# Patient Record
Sex: MALE | Race: WHITE | NOT HISPANIC OR LATINO | Employment: FULL TIME | URBAN - METROPOLITAN AREA
[De-identification: names, ages, dates, MRNs, and addresses within clinical notes are randomized per-mention and may not be internally consistent; named-entity substitution may affect disease eponyms.]

---

## 2023-12-11 ENCOUNTER — OFFICE VISIT (OUTPATIENT)
Dept: CARDIOLOGY | Facility: CLINIC | Age: 58
End: 2023-12-11
Payer: COMMERCIAL

## 2023-12-11 ENCOUNTER — TELEPHONE (OUTPATIENT)
Dept: CARDIOLOGY | Facility: CLINIC | Age: 58
End: 2023-12-11

## 2023-12-11 VITALS
HEIGHT: 70 IN | WEIGHT: 185.3 LBS | SYSTOLIC BLOOD PRESSURE: 128 MMHG | HEART RATE: 104 BPM | BODY MASS INDEX: 26.53 KG/M2 | DIASTOLIC BLOOD PRESSURE: 98 MMHG

## 2023-12-11 DIAGNOSIS — R94.31 ABNORMAL EKG: ICD-10-CM

## 2023-12-11 DIAGNOSIS — I10 ESSENTIAL HYPERTENSION: ICD-10-CM

## 2023-12-11 DIAGNOSIS — I47.10 SVT (SUPRAVENTRICULAR TACHYCARDIA) (CMS-HCC): ICD-10-CM

## 2023-12-11 DIAGNOSIS — F17.200 CURRENT EVERY DAY SMOKER: ICD-10-CM

## 2023-12-11 PROCEDURE — 3008F BODY MASS INDEX DOCD: CPT | Performed by: INTERNAL MEDICINE

## 2023-12-11 PROCEDURE — 99214 OFFICE O/P EST MOD 30 MIN: CPT | Performed by: INTERNAL MEDICINE

## 2023-12-11 PROCEDURE — 3080F DIAST BP >= 90 MM HG: CPT | Performed by: INTERNAL MEDICINE

## 2023-12-11 PROCEDURE — 3074F SYST BP LT 130 MM HG: CPT | Performed by: INTERNAL MEDICINE

## 2023-12-11 RX ORDER — LISINOPRIL AND HYDROCHLOROTHIAZIDE 10; 12.5 MG/1; MG/1
1 TABLET ORAL DAILY
COMMUNITY
End: 2023-12-11 | Stop reason: SDUPTHER

## 2023-12-11 RX ORDER — MULTIVITAMIN WITH IRON
1 TABLET ORAL DAILY
COMMUNITY

## 2023-12-11 RX ORDER — FAMOTIDINE 10 MG/1
20 TABLET ORAL DAILY
COMMUNITY

## 2023-12-11 NOTE — PATIENT INSTRUCTIONS
Continue same medications/treatment.  Patient educated on proper medication use.  Patient educated on risk factor modification.  Please bring any lab results from other providers/physicians to your next appointment.    Please bring all medicines, vitamins, and herbal supplements with you when you come to the office.    Prescriptions will not be filled unless you are compliant with your follow up appointments or have a follow up appointment scheduled as per instruction of your physician. Refills should be requested at the time of your visit.      Follow up in 6 months    TRIPP FRIAS RN, AM SCRIBING FOR AND IN THE PRESENCE OF AUSTIN ESCALONA MD

## 2023-12-11 NOTE — PROGRESS NOTES
CARDIOLOGY OFFICE VISIT      CHIEF COMPLAINT  Chief Complaint   Patient presents with    Follow-up     1 year           HISTORY OF PRESENT ILLNESS  Orlando Moe is a 58 y.o. year old male patient with a history of hypertension, atypical chest pain for which she had a stress test in 2015 that was normal.  He also had an echocardiogram that was essentially normal.  He was recently diagnosed with a very high TSH with low T3 and T4 and is following up with the endocrinologist for further evaluation.  He denies any chest pain or significant shortness of breath with his day-to-day activities.  Most recent labs from November 2023 shows TSH is 167 with a T3 of 1.7 and T40.25  Arrival cholesterol is 174, HDL is 43, LDL is 108 and triglyceride is 117    ASSESSMENT AND PLAN  1.  Hypertension: Blood pressure is well-controlled on current medications which we will continue.  2.  Abnormal thyroid function: He appears to have significant hypothyroidism based on his lab work and is following up with his endocrinologist.  I would like to get an echocardiogram to rule out any cardiac involvement especially the presence of pericardial effusion in the setting of significant hypothyroidism.  3.  Dyslipidemia: With acceptable lipid profile as noted above, continue with lipid-lowering therapy.    Problem List Items Addressed This Visit    None      Recent Cardiovascular Testing:    Echo-  Stress-  Cath-  Carotid Ultrasound-    Past Medical History  History reviewed. No pertinent past medical history.    Social History  Social History     Tobacco Use    Smoking status: Every Day     Types: Cigars    Smokeless tobacco: Not on file   Substance Use Topics    Alcohol use: Not Currently    Drug use: Not on file       Family History   No family history on file.     Allergies:  No Known Allergies     Outpatient Medications:  Current Outpatient Medications   Medication Instructions    famotidine (PEPCID AC) 20 mg, oral, Daily     "lisinopriL-hydrochlorothiazide 10-12.5 mg tablet 1 tablet, oral, Daily    multivitamin (Multiple Vitamins) tablet 1 tablet, oral, Daily        Recent Lab Results:    CBC:   Lab Results   Component Value Date    WBC 8.0 10/22/2019    RBC 4.95 10/22/2019    HGB 15.5 10/22/2019    HCT 45.8 10/22/2019     10/22/2019        CMP:    Lab Results   Component Value Date     10/22/2019    K 4.1 10/22/2019     10/22/2019    CO2 30 10/22/2019    BUN 16 10/22/2019    CREATININE 1.14 10/22/2019    GLUCOSE 103 (H) 10/22/2019    CALCIUM 9.8 10/22/2019       Magnesium:    No results found for: \"MG\"    Lipid Profile:    Lab Results   Component Value Date    TRIG 139 10/22/2019    HDL 35.0 (A) 10/22/2019       TSH:    No results found for: \"TSH\"    BNP:   No results found for: \"BNP\"     PT/INR:    No results found for: \"PROTIME\", \"INR\"    HgBA1c:    Lab Results   Component Value Date    HGBA1C 5.2 10/22/2019       BMP:  Lab Results   Component Value Date     10/22/2019    K 4.1 10/22/2019     10/22/2019    CO2 30 10/22/2019    BUN 16 10/22/2019    CREATININE 1.14 10/22/2019       CBC:  Lab Results   Component Value Date    WBC 8.0 10/22/2019    RBC 4.95 10/22/2019    HGB 15.5 10/22/2019    HCT 45.8 10/22/2019    MCV 93 10/22/2019    MCHC 33.8 10/22/2019    RDW 11.9 10/22/2019     10/22/2019       Cardiac Enzymes:    No results found for: \"TROPHS\"    Hepatic Function Panel:    Lab Results   Component Value Date    ALKPHOS 55 10/22/2019    ALT 17 10/22/2019    AST 15 10/22/2019    PROT 6.5 10/22/2019    BILITOT 0.5 10/22/2019    BILIDIR 0.1 10/22/2019         REVIEW OF SYSTEMS  Review of Systems   All other systems reviewed and are negative.      VITALS  Vitals:    12/11/23 0843   BP: (!) 128/98   Pulse: 104     Wt Readings from Last 4 Encounters:   12/11/23 84.1 kg (185 lb 4.8 oz)   12/05/22 79.7 kg (175 lb 12.8 oz)   12/06/21 74.1 kg (163 lb 7 oz)       PHYSICAL EXAM  Constitutional:       " Appearance: Healthy appearance. Not in distress.   Neck:      Vascular: No JVR. JVD normal.   Pulmonary:      Effort: Pulmonary effort is normal.      Breath sounds: Normal breath sounds. No wheezing. No rhonchi. No rales.   Chest:      Chest wall: Not tender to palpatation.   Cardiovascular:      PMI at left midclavicular line. Normal rate. Regular rhythm. Normal S1. Normal S2.       Murmurs: There is no murmur.      No gallop.  No click. No rub.   Pulses:     Intact distal pulses.   Edema:     Peripheral edema absent.   Abdominal:      General: Bowel sounds are normal.      Palpations: Abdomen is soft.      Tenderness: There is no abdominal tenderness.   Musculoskeletal: Normal range of motion.         General: No tenderness. Skin:     General: Skin is warm and dry.   Neurological:      General: No focal deficit present.      Mental Status: Alert and oriented to person, place and time.

## 2023-12-11 NOTE — TELEPHONE ENCOUNTER
Patient was seen this morning and forgot to ask for a refill on his Lisinopril/hydrochlorothiazide 10-12.5 mg be sent to [facility]

## 2023-12-12 RX ORDER — LISINOPRIL AND HYDROCHLOROTHIAZIDE 10; 12.5 MG/1; MG/1
1 TABLET ORAL DAILY
Qty: 90 TABLET | Refills: 3 | Status: SHIPPED | OUTPATIENT
Start: 2023-12-12

## 2024-06-04 ENCOUNTER — HOSPITAL ENCOUNTER (OUTPATIENT)
Dept: CARDIOLOGY | Facility: CLINIC | Age: 59
Discharge: HOME | End: 2024-06-04
Payer: COMMERCIAL

## 2024-06-04 VITALS
SYSTOLIC BLOOD PRESSURE: 115 MMHG | HEIGHT: 70 IN | WEIGHT: 185 LBS | DIASTOLIC BLOOD PRESSURE: 75 MMHG | BODY MASS INDEX: 26.48 KG/M2

## 2024-06-04 DIAGNOSIS — I47.10 SVT (SUPRAVENTRICULAR TACHYCARDIA) (CMS-HCC): ICD-10-CM

## 2024-06-04 LAB
AORTIC VALVE MEAN GRADIENT: 3 MMHG
AORTIC VALVE PEAK VELOCITY: 1.11 M/S
AV PEAK GRADIENT: 4.9 MMHG
AVA (PEAK VEL): 3.28 CM2
AVA (VTI): 3.58 CM2
EJECTION FRACTION APICAL 4 CHAMBER: 55.7
LEFT VENTRICLE INTERNAL DIMENSION DIASTOLE: 4.3 CM (ref 3.5–6)
LEFT VENTRICULAR OUTFLOW TRACT DIAMETER: 2.1 CM
LV EJECTION FRACTION BIPLANE: 57 %
MITRAL VALVE E/A RATIO: 0.63
MITRAL VALVE E/E' RATIO: 7
RIGHT VENTRICLE PEAK SYSTOLIC PRESSURE: 23.6 MMHG

## 2024-06-04 PROCEDURE — 93306 TTE W/DOPPLER COMPLETE: CPT | Performed by: INTERNAL MEDICINE

## 2024-06-04 PROCEDURE — 93306 TTE W/DOPPLER COMPLETE: CPT

## 2024-06-12 ENCOUNTER — APPOINTMENT (OUTPATIENT)
Dept: CARDIOLOGY | Facility: CLINIC | Age: 59
End: 2024-06-12
Payer: COMMERCIAL

## 2024-06-21 ENCOUNTER — APPOINTMENT (OUTPATIENT)
Dept: CARDIOLOGY | Facility: CLINIC | Age: 59
End: 2024-06-21
Payer: COMMERCIAL

## 2024-06-21 VITALS
BODY MASS INDEX: 24.78 KG/M2 | HEIGHT: 70 IN | HEART RATE: 88 BPM | WEIGHT: 173.1 LBS | SYSTOLIC BLOOD PRESSURE: 122 MMHG | DIASTOLIC BLOOD PRESSURE: 82 MMHG

## 2024-06-21 DIAGNOSIS — E07.9 THYROID DISORDER: ICD-10-CM

## 2024-06-21 DIAGNOSIS — I10 ESSENTIAL HYPERTENSION: ICD-10-CM

## 2024-06-21 DIAGNOSIS — F17.200 CURRENT EVERY DAY SMOKER: ICD-10-CM

## 2024-06-21 DIAGNOSIS — I51.7 LVH (LEFT VENTRICULAR HYPERTROPHY): ICD-10-CM

## 2024-06-21 DIAGNOSIS — R94.31 ABNORMAL EKG: ICD-10-CM

## 2024-06-21 DIAGNOSIS — Z13.220 SCREENING FOR LIPID DISORDERS: ICD-10-CM

## 2024-06-21 PROCEDURE — 3074F SYST BP LT 130 MM HG: CPT | Performed by: INTERNAL MEDICINE

## 2024-06-21 PROCEDURE — 3008F BODY MASS INDEX DOCD: CPT | Performed by: INTERNAL MEDICINE

## 2024-06-21 PROCEDURE — 99214 OFFICE O/P EST MOD 30 MIN: CPT | Performed by: INTERNAL MEDICINE

## 2024-06-21 PROCEDURE — 3079F DIAST BP 80-89 MM HG: CPT | Performed by: INTERNAL MEDICINE

## 2024-06-21 RX ORDER — LISINOPRIL AND HYDROCHLOROTHIAZIDE 10; 12.5 MG/1; MG/1
1 TABLET ORAL DAILY
Qty: 90 TABLET | Refills: 3 | Status: SHIPPED | OUTPATIENT
Start: 2024-06-21

## 2024-06-21 RX ORDER — LEVOTHYROXINE SODIUM 150 UG/1
150 TABLET ORAL
COMMUNITY
Start: 2024-06-06

## 2024-06-21 ASSESSMENT — ENCOUNTER SYMPTOMS
CARDIOVASCULAR NEGATIVE: 1
NEUROLOGICAL NEGATIVE: 1
RESPIRATORY NEGATIVE: 1
CONSTITUTIONAL NEGATIVE: 1

## 2024-06-21 NOTE — PROGRESS NOTES
CARDIOLOGY OFFICE VISIT      CHIEF COMPLAINT  Chief Complaint   Patient presents with    Follow-up     6 MONTH FOLLOW UP AND TO REVIEW RECENT ECHOCARDIOGRAM RESULTS        HISTORY OF PRESENT ILLNESS  Orlando Moe is a 58 y.o. year old male patient with a history of hypertension, atypical chest pain for which he had a stress test in 2015 that was normal.  He also had an echocardiogram that showed mild to moderate concentric LVH which is unchanged on his repeat echocardiogram in June 2024.  LV function is normal.  He is recently diagnosed with severe hypothyroidism for which she follows with the endocrinologist.  He says been doing well denying any chest pain palpitations presyncope or syncope.  Unfortunately continues to smoke about a pack per day.  Lipids from December 2023 shows total cholesterol is 174, HDL is 43, LDL is 108 and triglyceride is 117    ASSESSMENT AND PLAN  1.  Hypertension, blood pressure is well-controlled current medications which we will continue.  2.  Hypothyroidism: Currently on thyroid replacement therapy with normalization of his thyroid function.  3.  Dyslipidemia: With acceptable lipid profile as noted above, continue with current lipid lowering therapy.    Problem List Items Addressed This Visit       Essential hypertension    Current every day smoker    Abnormal EKG    BMI 26.0-26.9,adult    LVH (left ventricular hypertrophy)    Thyroid disorder     Other Visit Diagnoses       Screening for lipid disorders                Recent Cardiovascular Testing:    Echo-  Stress-  Cath-  Carotid Ultrasound-    Past Medical History  No past medical history on file.    Social History  Social History     Tobacco Use    Smoking status: Every Day     Types: Cigars     Start date: 1994    Smokeless tobacco: Not on file   Substance Use Topics    Alcohol use: Not Currently    Drug use: Not Currently       Family History     Family History   Problem Relation Name Age of Onset    Hypertension Mother       "Lung cancer Father          Allergies:  No Known Allergies     Outpatient Medications:  Current Outpatient Medications   Medication Instructions    bioflav,lemon/vit Bcomp,C (LIPO-FLAVONOID PLUS ORAL) oral, Lipo-Flavonoid Plus TABS; 1 to 2 daily as needed    famotidine (PEPCID AC) 20 mg, oral, Daily    glucos sul 2KCl/msm/chond/C/Mn (GLUCOSAMINE CHONDROITIN ORAL) oral    levothyroxine (SYNTHROID, LEVOXYL) 150 mcg, oral, Daily (0630)    lisinopriL-hydrochlorothiazide 10-12.5 mg tablet 1 tablet, oral, Daily    multivitamin (Multiple Vitamins) tablet 1 tablet, oral, Daily        Recent Lab Results:    CBC:   Lab Results   Component Value Date    WBC 8.0 10/22/2019    RBC 4.95 10/22/2019    HGB 15.5 10/22/2019    HCT 45.8 10/22/2019     10/22/2019        CMP:    Lab Results   Component Value Date     10/22/2019    K 4.1 10/22/2019     10/22/2019    CO2 30 10/22/2019    BUN 16 10/22/2019    CREATININE 1.14 10/22/2019    GLUCOSE 103 (H) 10/22/2019    CALCIUM 9.8 10/22/2019       Magnesium:    No results found for: \"MG\"    Lipid Profile:    Lab Results   Component Value Date    TRIG 139 10/22/2019    HDL 35.0 (A) 10/22/2019       TSH:    No results found for: \"TSH\"    BNP:   No results found for: \"BNP\"     PT/INR:    No results found for: \"PROTIME\", \"INR\"    HgBA1c:    Lab Results   Component Value Date    HGBA1C 5.6 11/12/2023       BMP:  Lab Results   Component Value Date     10/22/2019    K 4.1 10/22/2019     10/22/2019    CO2 30 10/22/2019    BUN 16 10/22/2019    CREATININE 1.14 10/22/2019       CBC:  Lab Results   Component Value Date    WBC 8.0 10/22/2019    RBC 4.95 10/22/2019    HGB 15.5 10/22/2019    HCT 45.8 10/22/2019    MCV 93 10/22/2019    MCHC 33.8 10/22/2019    RDW 11.9 10/22/2019     10/22/2019       Cardiac Enzymes:    No results found for: \"TROPHS\"    Hepatic Function Panel:    Lab Results   Component Value Date    ALKPHOS 55 10/22/2019    ALT 17 10/22/2019    AST 15 " 10/22/2019    PROT 6.5 10/22/2019    BILITOT 0.5 10/22/2019    BILIDIR 0.1 10/22/2019         REVIEW OF SYSTEMS  Review of Systems   Constitutional: Negative.   Cardiovascular: Negative.    Respiratory: Negative.     Neurological: Negative.    All other systems reviewed and are negative.      VITALS  Vitals:    06/21/24 0830   BP: 122/82   Pulse: 88     Wt Readings from Last 4 Encounters:   06/21/24 78.5 kg (173 lb 1.6 oz)   06/04/24 83.9 kg (185 lb)   12/11/23 84.1 kg (185 lb 4.8 oz)   12/05/22 79.7 kg (175 lb 12.8 oz)       PHYSICAL EXAM  Constitutional:       Appearance: Healthy appearance.   Eyes:      Pupils: Pupils are equal, round, and reactive to light.   Pulmonary:      Effort: Pulmonary effort is normal.      Breath sounds: Normal breath sounds.   Cardiovascular:      PMI at left midclavicular line. Normal rate. Regular rhythm.      Murmurs: There is no murmur.      No gallop.  No click. No rub.   Pulses:     Intact distal pulses.   Musculoskeletal: Normal range of motion.      Cervical back: Normal range of motion. Skin:     General: Skin is warm and dry.   Neurological:      General: No focal deficit present.      Mental Status: Alert and oriented to person, place and time.

## 2025-01-23 ENCOUNTER — TELEPHONE (OUTPATIENT)
Dept: CARDIOLOGY | Facility: CLINIC | Age: 60
End: 2025-01-23
Payer: COMMERCIAL

## 2025-06-20 ENCOUNTER — APPOINTMENT (OUTPATIENT)
Dept: CARDIOLOGY | Facility: CLINIC | Age: 60
End: 2025-06-20
Payer: COMMERCIAL

## 2025-06-23 ENCOUNTER — APPOINTMENT (OUTPATIENT)
Dept: CARDIOLOGY | Facility: CLINIC | Age: 60
End: 2025-06-23
Payer: COMMERCIAL

## 2025-06-23 VITALS
WEIGHT: 162.3 LBS | DIASTOLIC BLOOD PRESSURE: 80 MMHG | HEART RATE: 78 BPM | HEIGHT: 69 IN | SYSTOLIC BLOOD PRESSURE: 110 MMHG | BODY MASS INDEX: 24.04 KG/M2

## 2025-06-23 DIAGNOSIS — R94.31 ABNORMAL EKG: ICD-10-CM

## 2025-06-23 DIAGNOSIS — Z13.220 SCREENING FOR LIPOID DISORDERS: ICD-10-CM

## 2025-06-23 DIAGNOSIS — I51.7 LVH (LEFT VENTRICULAR HYPERTROPHY): ICD-10-CM

## 2025-06-23 DIAGNOSIS — I10 ESSENTIAL HYPERTENSION: ICD-10-CM

## 2025-06-23 PROCEDURE — 3008F BODY MASS INDEX DOCD: CPT | Performed by: INTERNAL MEDICINE

## 2025-06-23 PROCEDURE — 99213 OFFICE O/P EST LOW 20 MIN: CPT | Performed by: INTERNAL MEDICINE

## 2025-06-23 PROCEDURE — 3074F SYST BP LT 130 MM HG: CPT | Performed by: INTERNAL MEDICINE

## 2025-06-23 PROCEDURE — 3079F DIAST BP 80-89 MM HG: CPT | Performed by: INTERNAL MEDICINE

## 2025-06-23 ASSESSMENT — ENCOUNTER SYMPTOMS
RESPIRATORY NEGATIVE: 1
CONSTITUTIONAL NEGATIVE: 1
NEUROLOGICAL NEGATIVE: 1
CARDIOVASCULAR NEGATIVE: 1

## 2025-06-23 NOTE — PATIENT INSTRUCTIONS
CMP and Lipid  1  year visit  Continue same medications and treatments.   Patient educated on proper medication use.   Patient educated on risk factor modification.   Please bring any lab results from other providers / physicians to your next appointment.     Please bring all medicines, vitamins, and herbal supplements with you when you come to the office.     Prescriptions will not be filled unless you are compliant with your follow up appointments or have a follow up appointment scheduled as per instruction of your physician. Refills should be requested at the time of your visit.

## 2025-06-23 NOTE — PROGRESS NOTES
CARDIOLOGY OFFICE VISIT      CHIEF COMPLAINT  No chief complaint on file.       HISTORY OF PRESENT ILLNESS  Orlando Moe is a 59 y.o. year old male patient with a history of hypertension, atypical chest pain fully showed a stress test in 2015 which was normal.  Echocardiogram shows mild to moderate LVH which is unchanged on his repeat echo in June 2024.  LV function remains normal.  He also has severe hypothyroidism on thyroid replacement therapy.  Unfortunately continues to smoke about a pack a day, but he continues to deny any chest pain or shortness of breath.  I have reviewed his lipids from September 2024 which is within normal limits with an LDL of 108 and total cholesterol of 174.    ASSESSMENT AND PLAN  1.  Hypertension: Blood pressure is well-controlled on current medications including lisinopril HCTZ which the patient is tolerating.  2.  Dyslipidemia: Lipid profile continues to be acceptable without any lipid-lowering therapy.  He is advised on diet and exercise.  3.  Hypothyroidism: This is stable on thyroid replacement therapy and is advised to follow-up with his endocrinologist.    Problem List Items Addressed This Visit       Essential hypertension    Abnormal EKG    BMI 23.0-23.9, adult    LVH (left ventricular hypertrophy)           Past Medical History  Medical History[1]    Social History  Social History[2]    Family History   Family History[3]     Allergies:  RX Allergies[4]     Outpatient Medications:  Current Outpatient Medications   Medication Instructions    bioflav,lemon/vit Bcomp,C (LIPO-FLAVONOID PLUS ORAL) oral, Lipo-Flavonoid Plus TABS; 1 to 2 daily as needed    famotidine (PEPCID AC) 20 mg, oral, Daily    glucos sul 2KCl/msm/chond/C/Mn (GLUCOSAMINE CHONDROITIN ORAL) oral    levothyroxine (SYNTHROID, LEVOXYL) 150 mcg, oral, Daily (0630)    lisinopriL-hydrochlorothiazide 10-12.5 mg tablet 1 tablet, oral, Daily    multivitamin (Multiple Vitamins) tablet 1 tablet, oral, Daily     "    Recent Lab Results:  The following labs have been reviewed noted below;    CBC:   Lab Results   Component Value Date    WBC 8.0 10/22/2019    RBC 4.95 10/22/2019    HGB 15.5 10/22/2019    HCT 45.8 10/22/2019     10/22/2019        CMP:    Lab Results   Component Value Date     10/22/2019    K 4.1 10/22/2019     10/22/2019    CO2 30 10/22/2019    BUN 16 10/22/2019    CREATININE 1.14 10/22/2019    GLUCOSE 103 (H) 10/22/2019    CALCIUM 9.8 10/22/2019       Magnesium:    No results found for: \"MG\"    Lipid Profile:    Lab Results   Component Value Date    TRIG 139 10/22/2019    HDL 35.0 (A) 10/22/2019       TSH:    No results found for: \"TSH\"    BNP:   No results found for: \"BNP\"     PT/INR:    No results found for: \"PROTIME\", \"INR\"    HgBA1c:    Lab Results   Component Value Date    HGBA1C 5.6 11/12/2023       BMP:  Lab Results   Component Value Date     10/22/2019    K 4.1 10/22/2019     10/22/2019    CO2 30 10/22/2019    BUN 16 10/22/2019    CREATININE 1.14 10/22/2019       CBC:  Lab Results   Component Value Date    WBC 8.0 10/22/2019    RBC 4.95 10/22/2019    HGB 15.5 10/22/2019    HCT 45.8 10/22/2019    MCV 93 10/22/2019    MCHC 33.8 10/22/2019    RDW 11.9 10/22/2019     10/22/2019       Cardiac Enzymes:    No results found for: \"TROPHS\"    Hepatic Function Panel:    Lab Results   Component Value Date    ALKPHOS 55 10/22/2019    ALT 17 10/22/2019    AST 15 10/22/2019    PROT 6.5 10/22/2019    BILITOT 0.5 10/22/2019    BILIDIR 0.1 10/22/2019         REVIEW OF SYSTEMS  Review of Systems   Constitutional: Negative.   Cardiovascular: Negative.    Respiratory: Negative.     Neurological: Negative.    All other systems reviewed and are negative.      VITALS  There were no vitals filed for this visit.  Wt Readings from Last 4 Encounters:   06/21/24 78.5 kg (173 lb 1.6 oz)   06/04/24 83.9 kg (185 lb)   12/11/23 84.1 kg (185 lb 4.8 oz)   12/05/22 79.7 kg (175 lb 12.8 oz) "       PHYSICAL EXAM  Constitutional:       Appearance: Healthy appearance.   Eyes:      Pupils: Pupils are equal, round, and reactive to light.   Pulmonary:      Effort: Pulmonary effort is normal.      Breath sounds: Normal breath sounds.   Cardiovascular:      PMI at left midclavicular line. Normal rate. Regular rhythm.      Murmurs: There is no murmur.      No gallop.  No click. No rub.   Pulses:     Intact distal pulses.   Musculoskeletal: Normal range of motion.      Cervical back: Normal range of motion. Skin:     General: Skin is warm and dry.   Neurological:      General: No focal deficit present.      Mental Status: Alert and oriented to person, place and time.             Efra FRIAS LPN   am scribing for, and in the presence of Dr. Adonay Ball MD  .    Dr. Adonay FRIAS MD  , personally performed the services described in the documentation as scribed by Efra Amador LPN   in my presence, and confirm it is both accurate and complete.      Dr. Adonay Ball MD  Thank you for allowing me to participate in the care of this patient. Please do not hesitate to contact me with any further questions or concerns.         [1] No past medical history on file.  [2]   Social History  Tobacco Use    Smoking status: Every Day     Types: Cigars     Start date: 1994    Smokeless tobacco: Not on file   Substance Use Topics    Alcohol use: Not Currently    Drug use: Not Currently   [3]   Family History  Problem Relation Name Age of Onset    Hypertension Mother      Lung cancer Father     [4] No Known Allergies

## 2025-06-29 DIAGNOSIS — I10 ESSENTIAL HYPERTENSION: ICD-10-CM

## 2025-06-30 RX ORDER — LISINOPRIL AND HYDROCHLOROTHIAZIDE 10; 12.5 MG/1; MG/1
1 TABLET ORAL DAILY
Qty: 90 TABLET | Refills: 3 | Status: SHIPPED | OUTPATIENT
Start: 2025-06-30 | End: 2026-06-30

## 2025-06-30 NOTE — TELEPHONE ENCOUNTER
Received request for prescription refills for patient.   Patient follows with Dr. Ball    Request is for lisinopril hydrochlorothiazide   Is patient currently on medication yes    Last OV 6/23/2025  Next OV 6/22/2026    Pended for signing and sent to provider

## 2026-06-22 ENCOUNTER — APPOINTMENT (OUTPATIENT)
Dept: CARDIOLOGY | Facility: CLINIC | Age: 61
End: 2026-06-22